# Patient Record
Sex: FEMALE | Race: WHITE | NOT HISPANIC OR LATINO | URBAN - METROPOLITAN AREA
[De-identification: names, ages, dates, MRNs, and addresses within clinical notes are randomized per-mention and may not be internally consistent; named-entity substitution may affect disease eponyms.]

---

## 2021-03-22 ENCOUNTER — OFFICE VISIT (OUTPATIENT)
Dept: URBAN - METROPOLITAN AREA CLINIC 19 | Facility: CLINIC | Age: 61
End: 2021-03-22
Payer: COMMERCIAL

## 2021-03-22 DIAGNOSIS — K64.2 GRADE III INTERNAL HEMORRHOIDS: ICD-10-CM

## 2021-03-22 DIAGNOSIS — K62.5 RECTAL BLEEDING: ICD-10-CM

## 2021-03-22 PROCEDURE — 99243 OFF/OP CNSLTJ NEW/EST LOW 30: CPT | Performed by: INTERNAL MEDICINE

## 2021-03-22 NOTE — PHYSICAL EXAM GASTROINTESTINAL
Abdomen , soft, nontender, nondistended , no guarding or rigidity , no masses palpable , normal bowel sounds , Liver and Spleen , no hepatomegaly present , no hepatosplenomegaly , liver nontender , spleen not palpable , Rectal exam in the presence of a chaperone and anoscopy , normal sphincter tone ,  ininflamed enlarged internal hemorrhoids, rectal masses or bleeding present

## 2021-03-22 NOTE — HPI-TODAY'S VISIT:
is a 60 yr old woman referred by Dr. Tk Ya for blood in stool. A copy of this note will be sent to the referring provider. Has been going on for a long time.  Initially was only on toilet paper - now it is in stool as well.  No constipation, no change in bowel habits.  Colonoscopy reportedly in Nov 2017 by  - sigmoid diverticulosis and grade I  internal hemorrhoids.  Denies any upper GI symptoms. Does use Meloxicam frequently.

## 2021-05-07 ENCOUNTER — OFFICE VISIT (OUTPATIENT)
Dept: URBAN - METROPOLITAN AREA CLINIC 19 | Facility: CLINIC | Age: 61
End: 2021-05-07
Payer: COMMERCIAL

## 2021-05-07 ENCOUNTER — WEB ENCOUNTER (OUTPATIENT)
Dept: URBAN - METROPOLITAN AREA CLINIC 19 | Facility: CLINIC | Age: 61
End: 2021-05-07

## 2021-05-07 DIAGNOSIS — K64.2 GRADE III INTERNAL HEMORRHOIDS: ICD-10-CM

## 2021-05-07 PROCEDURE — 99213 OFFICE O/P EST LOW 20 MIN: CPT | Performed by: INTERNAL MEDICINE

## 2021-05-07 PROCEDURE — 46611 ANOSCOPY: CPT | Performed by: INTERNAL MEDICINE

## 2021-05-07 NOTE — PHYSICAL EXAM GASTROINTESTINAL
Abdomen , soft, nontender, nondistended , no guarding or rigidity , no masses palpable , normal bowel sounds , Liver and Spleen no hepatosplenomegaly , liver nontender , spleen not palpable , Rectal exam in the presence of a chaperone , normal sphincter tone , Grade III internal hemorrhoids, no rectal masses or bleeding present

## 2021-05-07 NOTE — HPI-TODAY'S VISIT:
is here for hemorrhoid banding.  With anusol suppositories her hemorrhoid bleeding and itching stopped .

## 2021-06-01 ENCOUNTER — OFFICE VISIT (OUTPATIENT)
Dept: URBAN - METROPOLITAN AREA CLINIC 19 | Facility: CLINIC | Age: 61
End: 2021-06-01
Payer: COMMERCIAL

## 2021-06-01 DIAGNOSIS — K64.2 GRADE III INTERNAL HEMORRHOIDS: ICD-10-CM

## 2021-06-01 PROCEDURE — 99213 OFFICE O/P EST LOW 20 MIN: CPT | Performed by: INTERNAL MEDICINE

## 2021-06-01 NOTE — HPI-TODAY'S VISIT:
is here for hemorrhoid banding.  With anusol suppositories her hemorrhoid bleeding and itching stopped . She had hemorrhoid banding of left lateral hemorrhoid column last time and is here for her second session.  She still has to straing occasionally but no more anal symptoms.

## 2021-06-01 NOTE — PHYSICAL EXAM GASTROINTESTINAL
Abdomen , soft, nontender, nondistended , no guarding or rigidity , no masses palpable , normal bowel sounds , Liver and Spleen  no hepatosplenomegaly , liver nontender , spleen not palpable  Rectal exam in the presence of a chaperone with anoscopy-  improved left lateral hemorrhoid column with banding scar, enlarged right posterior column and small right anterior hemorrhoid column

## 2021-10-19 ENCOUNTER — WEB ENCOUNTER (OUTPATIENT)
Dept: URBAN - METROPOLITAN AREA CLINIC 19 | Facility: CLINIC | Age: 61
End: 2021-10-19

## 2021-10-19 ENCOUNTER — OFFICE VISIT (OUTPATIENT)
Dept: URBAN - METROPOLITAN AREA CLINIC 19 | Facility: CLINIC | Age: 61
End: 2021-10-19
Payer: COMMERCIAL

## 2021-10-19 VITALS
OXYGEN SATURATION: 96 % | HEIGHT: 62 IN | SYSTOLIC BLOOD PRESSURE: 142 MMHG | TEMPERATURE: 98.2 F | BODY MASS INDEX: 40.45 KG/M2 | DIASTOLIC BLOOD PRESSURE: 90 MMHG | WEIGHT: 219.8 LBS | HEART RATE: 80 BPM

## 2021-10-19 DIAGNOSIS — K59.04 CHRONIC IDIOPATHIC CONSTIPATION: ICD-10-CM

## 2021-10-19 PROBLEM — 82934008: Status: ACTIVE | Noted: 2021-10-19

## 2021-10-19 PROCEDURE — 99214 OFFICE O/P EST MOD 30 MIN: CPT | Performed by: INTERNAL MEDICINE

## 2021-10-19 NOTE — HPI-TODAY'S VISIT:
is here for hemorrhoid banding.  With anusol suppositories her hemorrhoid bleeding and itching stopped . She had hemorrhoid banding of left lateral hemorrhoid column last time and is here for her second session.  She still has to strain occasionally but no more anal symptoms.  10/19/2021-  She is here for an urgent office visit after an ER visit with obstipation. CT abdomen showed large amount of stool through out her colon with stool impaction in rectum. She wa sgiven enemas and laxatives and has had BM since then. One BM every 3 to 4 days No blood. Hemorrhoids are bulging again but no bleeding. No new medications.  having knee surgery on Nov 6th. monitor.

## 2023-08-17 ENCOUNTER — WEB ENCOUNTER (OUTPATIENT)
Dept: URBAN - METROPOLITAN AREA CLINIC 19 | Facility: CLINIC | Age: 63
End: 2023-08-17

## 2023-08-23 ENCOUNTER — DASHBOARD ENCOUNTERS (OUTPATIENT)
Age: 63
End: 2023-08-23

## 2023-08-23 ENCOUNTER — OFFICE VISIT (OUTPATIENT)
Dept: URBAN - METROPOLITAN AREA CLINIC 19 | Facility: CLINIC | Age: 63
End: 2023-08-23
Payer: COMMERCIAL

## 2023-08-23 ENCOUNTER — LAB OUTSIDE AN ENCOUNTER (OUTPATIENT)
Dept: URBAN - METROPOLITAN AREA CLINIC 19 | Facility: CLINIC | Age: 63
End: 2023-08-23

## 2023-08-23 ENCOUNTER — WEB ENCOUNTER (OUTPATIENT)
Dept: URBAN - METROPOLITAN AREA CLINIC 19 | Facility: CLINIC | Age: 63
End: 2023-08-23

## 2023-08-23 VITALS
DIASTOLIC BLOOD PRESSURE: 60 MMHG | OXYGEN SATURATION: 97 % | SYSTOLIC BLOOD PRESSURE: 102 MMHG | TEMPERATURE: 97.8 F | WEIGHT: 218.8 LBS | BODY MASS INDEX: 40.27 KG/M2 | HEART RATE: 88 BPM | HEIGHT: 62 IN

## 2023-08-23 DIAGNOSIS — Z12.11 SCREEN FOR COLON CANCER: ICD-10-CM

## 2023-08-23 PROCEDURE — 99213 OFFICE O/P EST LOW 20 MIN: CPT | Performed by: NURSE PRACTITIONER

## 2023-08-23 NOTE — HPI-TODAY'S VISIT:
Ms. Salazar is a 62 yo female with PMH of HTN, obesity, anxiety/depression, hemorrhoid banding who presents today for screening colonoscopy. Last seen in GI clinic by Dr. Oliveros on 10/19/2021 for chronic idiopathic constipation. She was started on Linzess and he ordered for colonoscopy but she ended having knee surgery (one in 2021 and one in 2022).  Today she reports that she is no longer on Linzess, now just takes a stool softener everyday which is working for her. Avoids cheese. Ready to schedule colonscopy. Has noticed occasional blood on tissue when wiping but otherwise no bloody or black stools. Currently taking course of PCN for possible strep infection prescribed by her GYN. On Meloxicam and Prednisone for inflammation in her feet possible tendonitis. She denies any family history of colon cancer or colon polyps. States her last colonoscopy was in 2017 she thinks [facility], mentioned a polyp. The colonoscopy prior to this she states was normal.      Prior clinic visits: * 6/1/2021 -  is here for hemorrhoid banding. With anusol suppositories her hemorrhoid bleeding and itching stopped . She had hemorrhoid banding of left lateral hemorrhoid column last time and is here for her second session. She still has to strain occasionally but no more anal symptoms. * 10/19/2021- She is here for an urgent office visit after an ER visit with obstipation. CT abdomen showed large amount of stool through out her colon with stool impaction in rectum. She wa sgiven enemas and laxatives and has had BM since then. One BM every 3 to 4 days No blood. Hemorrhoids are bulging again but no bleeding. No new medications. having knee surgery on Nov 6th..

## 2023-10-12 ENCOUNTER — OFFICE VISIT (OUTPATIENT)
Dept: URBAN - METROPOLITAN AREA SURGERY CENTER 31 | Facility: SURGERY CENTER | Age: 63
End: 2023-10-12
Payer: COMMERCIAL

## 2023-10-12 DIAGNOSIS — Z12.11 COLON CANCER SCREENING (HIGH RISK): ICD-10-CM

## 2023-10-12 DIAGNOSIS — K63.5 BENIGN COLONIC POLYP: ICD-10-CM

## 2023-10-12 DIAGNOSIS — D12.3 ADENOMA OF TRANSVERSE COLON: ICD-10-CM

## 2023-10-12 DIAGNOSIS — Z12.11 COLON CANCER SCREENING: ICD-10-CM

## 2023-10-12 DIAGNOSIS — K57.30 DIVERTICULA, COLON: ICD-10-CM

## 2023-10-12 PROCEDURE — 00811 ANES LWR INTST NDSC NOS: CPT | Performed by: NURSE ANESTHETIST, CERTIFIED REGISTERED

## 2023-10-12 PROCEDURE — 45385 COLONOSCOPY W/LESION REMOVAL: CPT | Performed by: INTERNAL MEDICINE

## 2023-10-12 PROCEDURE — G8907 PT DOC NO EVENTS ON DISCHARG: HCPCS | Performed by: INTERNAL MEDICINE
